# Patient Record
Sex: FEMALE | Race: BLACK OR AFRICAN AMERICAN
[De-identification: names, ages, dates, MRNs, and addresses within clinical notes are randomized per-mention and may not be internally consistent; named-entity substitution may affect disease eponyms.]

---

## 2024-02-04 ENCOUNTER — HEALTH MAINTENANCE LETTER (OUTPATIENT)
Age: 29
End: 2024-02-04

## 2025-04-19 ENCOUNTER — HEALTH MAINTENANCE LETTER (OUTPATIENT)
Age: 30
End: 2025-04-19

## 2025-06-05 ASSESSMENT — SLEEP AND FATIGUE QUESTIONNAIRES
HOW LIKELY ARE YOU TO NOD OFF OR FALL ASLEEP WHILE SITTING INACTIVE IN A PUBLIC PLACE: SLIGHT CHANCE OF DOZING
HOW LIKELY ARE YOU TO NOD OFF OR FALL ASLEEP WHEN YOU ARE A PASSENGER IN A CAR FOR AN HOUR WITHOUT A BREAK: SLIGHT CHANCE OF DOZING
HOW LIKELY ARE YOU TO NOD OFF OR FALL ASLEEP WHILE SITTING AND TALKING TO SOMEONE: SLIGHT CHANCE OF DOZING
HOW LIKELY ARE YOU TO NOD OFF OR FALL ASLEEP WHILE SITTING AND READING: MODERATE CHANCE OF DOZING
HOW LIKELY ARE YOU TO NOD OFF OR FALL ASLEEP WHILE SITTING QUIETLY AFTER LUNCH WITHOUT ALCOHOL: SLIGHT CHANCE OF DOZING
HOW LIKELY ARE YOU TO NOD OFF OR FALL ASLEEP WHILE LYING DOWN TO REST IN THE AFTERNOON WHEN CIRCUMSTANCES PERMIT: SLIGHT CHANCE OF DOZING
HOW LIKELY ARE YOU TO NOD OFF OR FALL ASLEEP IN A CAR, WHILE STOPPED FOR A FEW MINUTES IN TRAFFIC: WOULD NEVER DOZE
HOW LIKELY ARE YOU TO NOD OFF OR FALL ASLEEP WHILE WATCHING TV: SLIGHT CHANCE OF DOZING

## 2025-06-06 NOTE — PROGRESS NOTES
"Lorna is a 30 year old who is being evaluated via a billable video visit.      The patient has been notified of following:     \"This video visit will be conducted via a call between you and your physician/provider. We have found that certain health care needs can be provided without the need for an in-person physical exam.  This service lets us provide the care you need with a video conversation.  If a prescription is necessary we can send it directly to your pharmacy.  If lab work is needed we can place an order for that and you can then stop by our lab to have the test done at a later time.    Video visits are billed at different rates depending on your insurance coverage.  Please reach out to your insurance provider with any questions.    If during the course of the call the physician/provider feels a video visit is not appropriate, you will not be charged for this service.\"    Patient has given verbal consent for Video visit? Yes    How would you like to obtain your AVS? MyChart    If the video visit is dropped, the invitation should be resent by: Text to cell phone: [phone]    Will anyone else be joining your video visit? No    I    Video-Visit Details    Type of service:  Video Visit    Originating Location (pt. Location): Home    Distant Location (provider location):   [facility]    Platform used for Video Visit: Semantra    Video Start Time: 10:23 AM    Video End Time:11:05 AM     New Medical Weight Management Consult    PATIENT:  Lorna Pastor   MRN:         [id number]   :         1995  KAREL:         2025      Dear Juliette Grey MD,    I had the pleasure of seeing your patient, Lorna Pastor. Full intake/assessment was done to determine barriers to weight loss success and develop a treatment plan. Lorna Pastor is a 30 year old female interested in treatment of medical problems associated with excess weight. She has a height of 5' 4.02\", a " "weight of 212 lbs 0 oz, and the calculated Body mass index is 36.37 kg/m .    Assessment & Plan   Problem List Items Addressed This Visit       Class 2 obesity due to excess calories without serious comorbidity with body mass index (BMI) of 36.0 to 36.9 in adult - Primary    6/12/2025 initial evaluation. Pt will start Wegovy titration. Discussed mechanism of action, common side effects, titration guidelines, and discussed risks for pancreatitis/gallbladder problems/gastroparesis/low sugars/MTC/MEN2. Medication handout given in AVS. Discussed long term use and variable rate of weight loss.   Pt will read about Metformin as a 2nd option in case insurance denies coverage. We also discussed Contrave as an option.  We reviewed recommendations for muscle conservation including eating three meals daily, good protein intake, and strength training.   Dietician appointment later this month.  Baseline labs ordered.         Relevant Medications    semaglutide-weight management (WEGOVY) 0.25 MG/0.5ML pen    Semaglutide-Weight Management (WEGOVY) 0.5 MG/0.5ML pen    Semaglutide-Weight Management (WEGOVY) 1 MG/0.5ML pen    Other Relevant Orders    Vitamin D Screen    Hemoglobin A1c    TSH with free T4 reflex    Adult Nutrition  Referral        PROGRAM OVERVIEW  Reviewed options at [facility].   All questions were answered. Education provided on chronic disease management of obesity.    SURGICAL WEIGHT LOSS   Option presented given pt BMI and current comorbid conditions. No current interest due to multiple abdominal scars from previous surgery.    MEDICATIONS:  We discussed healthy habits to assist with weight loss. We reviewed medications associated with weight gain. We discussed the role of pharmacological agents in the treatment of obesity and the \"off-label\" use of medications in this practice. We reviewed medication that may assist with weight loss. Indications, contraindications, risks/benefits, and " potential side effects were discussed. Explained medications must always be used together with lifestyle changes. Reviewed rationale for long term use of pharmacotherapy in chronic disease management for obesity.      AOM Considerations:  Phentermine:  Avoid due to pt stating she cannot tolerate stimulants.  Topiramate: Pt wants to avoid d/t concerns for side effects.  GLP-1: On wegovy in past through online pharmacy, stopped due to cost. Rx sent for Wegovy today.     Naltrexone: Option   Wellbutrin: Option   Metformin: 2nd option  Contrave:  Qsymia:              PATIENT INSTRUCTIONS:    Our  will contact you to set up a consult with one of our surgeons to discuss how they can help with removing abdominal scar tissue.  Start Wegovy (semaglutide)   0.25 mg once weekly for 4-8 weeks,   if tolerating increase to 0.5 mg weekly for 4-8 weeks,   if tolerating increase to 1 mg weekly  May use famotidine 20 mg twice daily as needed for nausea/heartburn when starting the medication.   Read about Metformin or Contrave (naltrexone + wellbutrin) as other options in case insurance denies coverage of Wegovy.   3. Labs ordered.  Please call [phone] to set up a lab appt.      Goals:  Eat within 1 hour of waking. Try to eat every 4-6 hours. Prioritize protein 1st and fruit/veggies/fiber 2nd.  Strive to drink 64oz water throughout the day.  Great job with your activity! Aim for 150-300 minutes of activity per week and strength train twice a week to preserve muscle.      Follow up:    Call [phone] to schedule next visit in September with Mindy Vences NP   Dietician appointment 6/23/25    51 minutes spent on the date of the encounter doing chart review, history and exam, review test results, counseling, developing plan of care, documentation, and further activities as noted above.      She has the following co-morbidities:        6/5/2025     7:44 PM   --   I have the following health issues associated with  "obesity None of the above   I have the following symptoms associated with obesity Back Pain    Fatigue    Irregular Menstral Cycle   Not evaluated for PCOS, but pt reports she experiences symptoms of PCOS.         No data to display                    6/5/2025     7:44 PM   Referring Provider   Please name the provider who referred you to Medical Weight Management  If you do not know, please answer \"I Don't Know\" I dont know           6/5/2025     7:44 PM   Weight History   How concerned are you about your weight? Very Concerned   I became overweight After Pregnancy   The following factors have contributed to my weight gain Mental Health Issues    Started on Medication that Caused Weight Gain    Eating Too Much    Genetic (Runs in the Family)    Stress    Other   I have tried the following methods to lose weight Watching Portions or Calories    Exercise    Medications    Fasting   My lowest weight since age 19 was 198   My highest weight since age 19 was 236   The most weight I have ever lost was (lbs) 0   I have the following family history of obesity/being overweight My mother is overweight    Many of my relatives are overweight   How has your weight changed over the last year? Lost   How many pounds? 22   Age 10 had encopresis and needed GI flushes for chronic constipation. G-tube was placed in umbilicus. Transitioned to ileostomy and developed sepsis. Needed emergency open abdominal surgery with drain placement. No longer has ostomy. Many abdominal scars.    Lost 26lbs with diet/exercise and Wegovy through an online pharmacy. Was on it for 3 months. Stopped Wegovy 3 months ago due to cost. Was not going through insurance. Denies any side effects, especially bowel changes, with Wegovy. Has been focusing on diet and exercise over the past 6 months and has been following a low-calorie diet.        6/5/2025     7:44 PM   Diet Recall Review with Patient   If you do eat lunch, what types of food do you typically eat? I " have a sandwhich , or make air fryed nuggets   If you do eat supper, what types of food do you typically eat? Chicken , pasta   If you do snack, what types of food do you typically eat? Chips , nuts , fruits   How many glasses of juice do you drink in a typical day? 1   How many of glasses of milk do you drink in a typical day? 0   How many 8oz glasses of sugar containing drinks such as Jas-Aid/sweet tea do you drink in a day? 2   How many cans/bottles of sugar pop/soda/tea/sports drinks do you drink in a day? 3   How often do you have a drink of alcohol? 2-4 Times a Month   If you do drink, how many drinks might you have in a day? 1 or 2   Followed PCOS diet in past, but reports it was hard to maintain. Is eating in a calorie deficit and aims for 1300 calories/day.  Wakes: 6:45A  B: skips  L: 12P Usually a sandwich or soup or pasta  D: 5-6P chicken or turkey with pasta or salad, or sushi  Snacks: Biggest concern, will snack instead of eat a meal. Quest protein chips, Quest peanut butter cups, rice cakes. Craves salty foods.  Bedtime: 9:30P    Hydration: iced coffee w/ sugar free syrup in AM. Water throughout the day w/ crystal lite or liquid IV. Rare Celsius energy drink.        6/5/2025     7:44 PM   Eating Habits   Generally, my meals include foods like these bread, pasta, rice, potatoes, corn, crackers, sweet dessert, pop, or juice Almost Everyday   Generally, my meals include foods like these fried meats, brats, burgers, french fries, pizza, cheese, chips, or ice cream A Few Times a Week   Eat fast food (like McDonalds, Burger Omari, Taco Bell) A Few Times a Week   Eat at a buffet or sit-down restaurant Less Than Weekly   Eat most of my meals in front of the TV or computer Less Than Weekly   Often skip meals, eat at random times, have no regular eating times Everyday   Rarely sit down for a meal but snack or graze throughout Everyday   Eat extra snacks between meals Almost Everyday   Eat most of my food at the  end of the day A Few Times a Week   Eat in the middle of the night or wake up at night to eat Never   Eat extra snacks to prevent or correct low blood sugar Almost Everyday   Eat to prevent acid reflux or stomach pain Never   Worry about not having enough food to eat Never   I eat when I am depressed Almost Everyday   I eat when I am stressed Almost Everyday   I eat when I am bored Almost Everyday   I eat when I am anxious Almost Everyday   I eat when I am happy or as a reward Almost Everyday   I feel hungry all the time even if I just have eaten Almost Everyday   Feeling full is important to me Almost Everyday   I finish all the food on my plate even if I am already full Almost Everyday   I can't resist eating delicious food or walk past the good food/smell Less Than Weekly   I eat/snack without noticing that I am eating Less Than Weekly   I eat when I am preparing the meal A Few Times a Week   I eat more than usual when I see others eating Less Than Weekly   I have trouble not eating sweets, ice cream, cookies, or chips if they are around the house Never   I think about food all day Never   What foods, if any, do you crave? Chips/Crackers           6/5/2025     7:44 PM   Amount of Food   I feel out of control when eating Weekly   I eat a large amount of food, like a loaf of bread, a box of cookies, a pint/quart of ice cream, all at once Never   I eat a large amount of food even when I am not hungry Never   I eat rapidly Monthly   I eat alone because I feel embarrassed and do not want others to see how much I have eaten Never   I eat until I am uncomfortably full Weekly   I feel bad, disgusted, or guilty after I overeat Everyday   Denies intentionally vomiting after eating/using diuretics/laxatives or other purging type activities   Will emotionally binge eat once or twice/month. Stressed multiple times it happens less than once/week.    Binge Eating Screening:  Objective binges at least 1x per week for the past 3  months. No, 1-2 times/month.  Patient senses lack of control over eating during the binges.Yes  Binge eating causes stress. Yes, feels shameful.  Binge eating episodes are associated with 3 or more of the following:    Eating until uncomfortably full.  Eating large amounts when not physically hungry.   Eating much more rapidly than usual.   Eating alone due to being embarassed by the amount eaten.   Feeling disgusted, depressed, or guilty after overeating.   If patient answers yes to 3 of the above questions and answered yes to frequency, distress of eating behavior and avoids using compensatory behaviors, refer for binge eating assessment.            6/5/2025     7:44 PM   Activity/Exercise History   How much of a typical 12 hour day do you spend sitting? Less Than Half the Day   How much of a typical 12 hour day do you spend lying down? Less Than Half the Day   How much of a typical day do you spend walking/standing? Most of the Day   How many hours (not including work) do you spend on the TV/Video Games/Computer/Tablet/Phone? 1 Hour or Less   How many times a week are you active for the purpose of exercise? 2-3 Times a Week   What keeps you from being more active? Lack of Time   How many total minutes do you spend doing some activity for the purpose of exercising when you exercise? More Than 30 Minutes   Activity: Rides bikes, swims, does hiking on trails. Constantly outside and moving because she has 2 kids who have ASD and need to be moving often. Strength training 3x week.    PAST MEDICAL HISTORY:  Past Medical History:   Diagnosis Date    Asperger's syndrome     Bipolar disorders     Central auditory processing disorder     Constipation, chronic     Constipation, chronic Last 5 years    Severe constipation issues           6/5/2025     7:44 PM   Work/Social History Reviewed With Patient   My employment status is Full-Time    Stay at Home Parent    Student   My job is teacher   How much of your job is spent on  the computer or phone? Less Than 50%   How many hours do you spend commuting to work daily? 0   What is your marital status? /In a Relationship   If in a relationship, is your significant other overweight? N/A   If you have children, are they overweight? No   Who do you live with? My kids an    Who does the food shopping? 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  Pt does the cooking.  Eats out or gets takeout twice/month      Social History     Tobacco Use    Smoking status: Never   Vaping Use    Vaping status: Never Used   Substance Use Topics    Alcohol use: No    Drug use: No    1 alcoholic drink at social events        6/5/2025     7:44 PM   Mental Health History Reviewed With Patient   Have you ever been physically or sexually abused? No   How often in the past 2 weeks have you felt little interest or pleasure in doing things? Not at all   Over the past 2 weeks how often have you felt down, depressed, or hopeless? Not at all           6/5/2025     7:44 PM   Questions Reviewed With Patient   How ready are you to make changes regarding your weight? Number 1 = Not ready at all to make changes up to 10 = very ready. 10   How confident are you that you can change? 1 = Not confident that you will be successful making changes up to 10 = very confident. 10           6/5/2025     7:44 PM   Sleep History Reviewed With Patient   How many hours do you sleep at night? 11     STOP-BANG Total Score (range: 0 - 8) 2 (Low risk of KENDALL) Abnormal    Declines sleep referral.    MEDICATIONS:   Current Outpatient Medications   Medication Sig Dispense Refill    melatonin 3 MG tablet Take 9 mg by mouth nightly as needed.      semaglutide-weight management (WEGOVY) 0.25 MG/0.5ML pen Inject 0.5 mLs (0.25 mg) subcutaneously once a week. 2 mL 1    Semaglutide-Weight Management (WEGOVY) 0.5 MG/0.5ML pen Inject 0.5 mg subcutaneously once a week. 2 mL  1    Semaglutide-Weight Management (WEGOVY) 1 MG/0.5ML pen Inject 1 mg subcutaneously once a week. 2 mL 1       ALLERGIES:   Allergies   Allergen Reactions    Amoxicillin-Pot Clavulanate Hives       ROS:    HEENT  H/O glaucoma:  no  Cardiovascular  CAD:   no  Palpitations:   no  HTN:    No, only during pregnancy  Gastrointestinal  GERD:   no  Constipation:   yes  Liver Dz:   no  H/O Pancreatitis:  no  H/O Gastroparesis no  H/O Gallbladder Dz: no  Psychiatric  Anxiety:   yes  Depression:  yes  Bipolar:  yes  H/O ETOH/Drug abuse: no  H/O eating disorder: no  Endocrine  PMH/FMH of MTC or MEN2:  no  Neurologic:  H/O seizures:   no  Headaches:  yes  Memory Impairment:  no    H/O kidney stones:  no  Kidney disease:  No  Current birth control:  Nexplanon    LABS/RECORDS REVIEWED 6/12/2025. Reviewed outside CBC, BMP from 10/17/24.  TSH   Date Value Ref Range Status   01/03/2011 1.39 0.4 - 5.0 mU/L Final     Sodium   Date Value Ref Range Status   06/01/2011 139 133 - 143 mmol/L Final     Potassium   Date Value Ref Range Status   06/01/2011 4.2 3.4 - 5.3 mmol/L Final     Chloride   Date Value Ref Range Status   06/01/2011 106 96 - 110 mmol/L Final     Carbon Dioxide   Date Value Ref Range Status   06/01/2011 25 20 - 32 mmol/L Final     Anion Gap   Date Value Ref Range Status   06/01/2011 8 6 - 17 mmol/L Final     Glucose   Date Value Ref Range Status   06/01/2011 83 60 - 99 mg/dL Final     Urea Nitrogen   Date Value Ref Range Status   06/01/2011 11 5 - 24 mg/dL Final     Creatinine   Date Value Ref Range Status   06/01/2011 0.55 0.50 - 1.00 mg/dL Final     GFR Estimate   Date Value Ref Range Status   06/01/2011 GFR not calculated, patient <16 years old. mL/min/1.7m2 Final     Calcium   Date Value Ref Range Status   06/01/2011 7.6 (L) 8.7 - 10.8 mg/dL Final     ALT   Date Value Ref Range Status   05/30/2011 32 0 - 50 U/L Final     AST   Date Value Ref Range Status   05/30/2011 28 0 - 35 U/L Final     Cholesterol   Date Value  "Ref Range Status   01/03/2011 135 0 - 200 mg/dL Final     Comment:     LDL Cholesterol is the primary guide to therapy.     The NCEP recommends further evaluation of: patients with cholesterol <200   mg/dL   if additional risk factors are present, cholesterol >240 mg/dL, triglycerides   >150 mg/dL, or HDL <40 mg/dL.     HDL Cholesterol   Date Value Ref Range Status   01/03/2011 46 (L) 50 - 110 mg/dL Final     LDL Cholesterol Calculated   Date Value Ref Range Status   01/03/2011 77 0 - 129 mg/dL Final     Comment:     LDL Cholesterol is the primary guide to therapy: LDL-cholesterol goal in high   risk patients is <100 mg/dL and in very high risk patients is <70 mg/dL.     Triglycerides   Date Value Ref Range Status   05/30/2011 106 0 - 150 mg/dL Final     Hemoglobin   Date Value Ref Range Status   05/27/2011 8.2 (L) 11.7 - 15.7 g/dL Final           BP Readings from Last 6 Encounters:   08/21/12 113/64   05/09/12 113/70   06/09/11 107/65 (46%, Z = -0.10 /  50%, Z = 0.00)*   06/02/11 119/78 (85%, Z = 1.04 /  92%, Z = 1.41)*   04/13/11 103/69 (51%, Z = 0.03 /  74%, Z = 0.64)*     *BP percentiles are based on the 2017 AAP Clinical Practice Guideline for girls       Pulse Readings from Last 6 Encounters:   08/21/12 109   05/09/12 79   06/09/11 87   06/02/11 102   04/12/11 76       PHYSICAL EXAM:  Ht 5' 4.02\" (1.626 m)   Wt 212 lb (96.2 kg)   BMI 36.37 kg/m    GENERAL: Healthy, alert and no distress  RESP: No audible wheeze, cough, or visible cyanosis.  No visible retractions or increased work of breathing.    SKIN: Visible skin clear. No significant rash, abnormal pigmentation or lesions.  PSYCH: Mentation appears normal, affect normal/bright, judgement and insight intact, normal speech and appearance well-groomed.    COUNSELING:   Reviewed obesity as a chronic disease and comprehensive management stratagies.      We discussed Bariatric Basics including:  -eating 3 meals daily  -eating protein first  -eating slowly, " chewing food well  -avoiding/limiting calorie containing beverages  -limiting carbohydrates and changing to whole grains  -limiting restaurant or cafeteria eating to twice a week or less    We discussed the importance of restorative sleep and stress management in maintaining a healthy weight.  We discussed insulin resistance and glycemic index as it relates to appetite and weight control.   We discussed the importance of physical activity including cardiovascular and strength training in maintaining a healthier weight and explored viable options.  Patient education of above written in AVS.      Sincerely,    Mindy Vences NP

## 2025-06-12 ENCOUNTER — VIRTUAL VISIT (OUTPATIENT)
Dept: SURGERY | Facility: CLINIC | Age: 30
End: 2025-06-12
Payer: COMMERCIAL

## 2025-06-12 VITALS — HEIGHT: 64 IN | BODY MASS INDEX: 36.19 KG/M2 | WEIGHT: 212 LBS

## 2025-06-12 DIAGNOSIS — E66.812 CLASS 2 OBESITY DUE TO EXCESS CALORIES WITHOUT SERIOUS COMORBIDITY WITH BODY MASS INDEX (BMI) OF 36.0 TO 36.9 IN ADULT: Primary | ICD-10-CM

## 2025-06-12 DIAGNOSIS — E66.09 CLASS 2 OBESITY DUE TO EXCESS CALORIES WITHOUT SERIOUS COMORBIDITY WITH BODY MASS INDEX (BMI) OF 36.0 TO 36.9 IN ADULT: Primary | ICD-10-CM

## 2025-06-12 PROBLEM — N30.00 ACUTE CYSTITIS WITHOUT HEMATURIA: Status: ACTIVE | Noted: 2021-07-18

## 2025-06-12 PROBLEM — R87.610 ASCUS OF CERVIX WITH NEGATIVE HIGH RISK HPV: Status: ACTIVE | Noted: 2024-09-05

## 2025-06-12 NOTE — ASSESSMENT & PLAN NOTE
6/12/2025 initial evaluation. Pt will start Wegovy titration. Discussed mechanism of action, common side effects, titration guidelines, and discussed risks for pancreatitis/gallbladder problems/gastroparesis/low sugars/MTC/MEN2. Medication handout given in AVS. Discussed long term use and variable rate of weight loss.   Pt will read about Metformin as a 2nd option in case insurance denies coverage. We also discussed Contrave as an option.  We reviewed recommendations for muscle conservation including eating three meals daily, good protein intake, and strength training.   Dietician appointment later this month.  Baseline labs ordered.

## 2025-06-12 NOTE — Clinical Note
Christian Rodriguez. Could you please reach out to her to set up a consult with one of the surgeons? She is not interested in bariatric surgery but has many abdominal scars from previous surgeries and wants to talk to a surgeon about possible surgery to remove scar tissue. -Mindy

## 2025-06-12 NOTE — PATIENT INSTRUCTIONS
"It was nice to talk with you today! Below is the plan discussed.-  BREANNE Guillen      Pt Instructions:  Our  will contact you to set up a consult with one of our surgeons to discuss how they can help with removing abdominal scar tissue.  Start Wegovy (semaglutide)   0.25 mg once weekly for 4-8 weeks,   if tolerating increase to 0.5 mg weekly for 4-8 weeks,   if tolerating increase to 1 mg weekly  May use famotidine 20 mg twice daily as needed for nausea/heartburn when starting the medication.   Read about Metformin or Contrave (naltrexone + wellbutrin) as other options in case insurance denies coverage of Wegovy.   3. Labs ordered.  Please call [phone] to set up a lab appt.      Goals:  Eat within 1 hour of waking. Try to eat every 4-6 hours. Prioritize protein 1st and fruit/veggies/fiber 2nd.  Strive to drink 64oz water throughout the day.  Great job with your activity! Aim for 150-300 minutes of activity per week and strength train twice a week to preserve muscle.      Follow up:    Call [phone] to schedule next visit in September with Mindy Vences, NP   Dietician appointment 6/23/25                            Obesity is a complex chronic disease    Obesity is a brain disease that causes dysregulation of our energy system.  It is not a character flaw it is a chemistry flaw.      It has many causes.  80% is genetic.  These can be influenced by factors like an altered microbiome (microbes in your gut), endocrine disrupting chemicals, sedentary lifestyle, low nutrient/ high calorie foods, and weight promoting medications.     Energy homeostasis is tightly regulated by the central nervous system. The hypothalamus is the primary center for the regulation of energy balance.  The thermostat is influenced by signals in response to fullness, hunger, and others. With obesity are several impairments in those signals to your hypothalamus.  It causes your thermostat \"set point\"  is be too high.      When " you lose weight, you body's response it to defend its set point. Signals will be sent to your hypothalamus to decrease your metabolism, increase hunger, and decrease feeling of fullness.  This ultimately drives your weight back to you set range.     Medications can help regulate those signals.  If you respond well to obesity medications, these should be used lifelong.  Otherwise, if removed, your body will go back to that dysregulated state and defend its set point.  (You will regain your weight.)    Obesity is a chronic disease. It is hard to treat but we are learning more every day.  Treatment is improving by leaps and bounds.  The best thing you can do is continue close follow up with your obesity medicine provider.  Together we can tackle this disease.          LEAN PROTEIN SOURCES      Protein Source Portion Calories Grams of Protein                           Nonfat, plain Greek yogurt    (10 grams sugar or less) 3/4 cup (6 oz)  12-17   Light Yogurt (10 grams sugar or less) 3/4 cup (6 oz)  6-8   Protein Shake 1 shake 110-180 15-30   Skim/1% Milk or lactose-free milk 1 cup ( 8 oz)  8   Plain or light, flavored soymilk 1 cup  7-8   Plain or light, hemp milk 1 cup 110 6   Fat Free or 1% Cottage Cheese 1/2 cup 90 15   Part skim ricotta cheese 1/2 cup 100 14   Part skim or reduced fat cheese slices 1 ounce 65-80 8     Mozzarella String Cheese 1 80 8   Canned tuna, chicken, crab or salmon  (canned in water)  1/2 cup 100 15-20   White fish (broiled, grilled, baked) 3 ounces 100 21   [place]/Tuna (broiled, grilled, baked) 3 ounces 150-180 21   Shrimp, Scallops, Lobster, Crab 3 ounces 100 21   Pork loin, Pork Tenderloin 3 ounces 150 21   Boneless, skinless chicken /turkey breast                          (broiled, grilled, baked) 3 ounces 120 21   [place], [place] Mix, [place], and Venison 3 ounces 120 21   Lean cuts of red meat and pork (sirloin,   round, tenderloin, flank, ground 93%-96%) 3 ounces 170 21    Lean or Extra Lean Ground Turkey 1/2 cup 150 20   90-95% Lean [place] Burger 1 fallon 140-180 21   Low-fat casserole with lean meat 3/4 cup 200 17   Luncheon Meats                                                        (turkey, lean ham, roast beef, chicken) 3 ounces 100 21   Egg (boiled, poached, scrambled) 1 Egg 60 7   Egg Substitute 1/2 cup 70 10   Nuts (limit to 1 serving per day)  3 Tbsp. 150 7   Nut [place] (peanut, almond)  Limit to 1 serving or less daily 1 Tbsp. 90 4   Soy Burger (varies) 1  15   Garbanzo, Black, Kim Beans 1/2 cup 110 7   Refried Beans 1/2 cup 100 7   Kidney and Lima beans 1/2 cup 110 7   Tempeh 3 oz 175 18   Vegan crumbles 1/2 cup 100 14   Tofu 1/2 cup 110 14   Chili (beans and extra lean beef or turkey) 1 cup 200 23   Lentil Stew/Soup 1 cup 150 12   Black Bean Soup 1 cup 175 12      WEGOVY (semaglutide)      Wegovy (semaglutide) injection 2.4 mg is an injectable prescription medicine used for adults with obesity (BMI >=30) or overweight (excess weight) (BMI >=27) who also have weight-related medical problems to help them lose weight and keep the weight off.  It is a GLP-1 agonist medication. GLP1 agonists stimulate the hormone GLP-1 in your body to allow you to feel full quickly and stay full longer.    Directions:  Start Wegovy (semaglutide) 0.25 mg once weekly for 4 weeks, then if tolerating increase to 0.5 mg weekly for 4 weeks, then if tolerating increase to 1 mg weekly for 4 weeks, then if tolerating increase to 1.7 mg weekly for 4 weeks, then if tolerating increase to 2.4 mg weekly thereafter.      -Each Wegovy pen is a once weekly single-dose prefilled pen with a pen injector already built within the pen. Discard the Wegovy pen after use in sharps container.     Common Side Effects:    nausea, headache, diarrhea, stomach upset.  If these become unmanageable call or mychart.    Serious Side effects:   Pancreatitis (inflammation of the pancreas) has been associated with this  type of medication, but is very rare.Symptoms of pancreatitis include: Pain in your upper stomach area which may travel to your back and be worse after eating.     Tips to help with side effects:  For Nausea/Heartburn:  Eat small, protein focused meals throughout the day  Stay hydrated.-The medication can decrease your drive for thirst  Eat slowly. STOP at the first sign of satisfaction  Eat at regular intervals, letting hours pass without eating- can cause nausea.  AVOID foods that are high in fat and sugar .      For constipation:  Stay hydrated and make sure you are moving.    Miralax 1 scoop/packet up daily    Ducosate Sodium 1 pill twice daily (stool softener)    Storage:   Store the prescription in the refrigerator. Once it is time to use the Wegovy pen, you can keep the pen at room temperature and it is good for up to 28 days at room temperature.     How to inject:  For a video on how to use the pen please go to:  https://[email or web address]/about-[employer]/how-to-use-the-wegovy-pen.html#itemTwo      METFORMIN    Metformin is a medication that is used to assist with lowering blood sugars in patients that have Pre-Diabetes or Diabetes. It has also been found to help with modest weight loss.    Metformin helps to lower blood sugars by lowering the amount of sugar (glucose) your liver produces that would otherwise cause your blood sugar to increase. It also makes your body respond better to insulin (the product that lowers your blood sugar).     Emerging research reported in journals suggests metformin may also lead to weight loss as a result of changes in the appetite centers of the brain, shifts in the gut microbiome, and reversal of metabolic changes that usually happen with age.    Starting instructions:  Take 1 tablet by mouth daily with a meal for 1 week, then increase to 2 tablets daily with a meal, if tolerating can increase to 3 tablets daily with a meal or at bedtime.     Side-effects. Metformin is generally  "well tolerated. The main side-effects we see are: Diarrhea, nausea and stomach upset - this tends to subside over time as your body gets used to the medication. Taking this medications with food will lower these side effects.    For any questions or concerns please send a [employer] message to our team or call our weight management call center at [phone].     In order to get refills of this or any medication we prescribe you must be seen in the medical weight mgmt clinic every 6 months.        Naltrexone    Naltrexone is a medication that is used most often to help people who are troubled by dependence on prescription pain killers or alcohol. It has also been found to help with weight loss. Although it's not currently FDA approved for weight loss, it has been used safely for a number of years to help people who are carrying extra weight.     Just how Naltrexone helps with weight loss has not been exactly determined.  It seems to work by quieting down brain signals related to strong food cravings. Many of our patients use the word \"addiction\" to describe their feelings and constant thoughts about food. It makes sense then to treat the feeling of dependence on food, outside of real hunger, with a medication designed to help with other sorts of dependence.     Our patients on Naltrexone find that they:    >feel less interest in food   >think less about food and eating and have more time to think of other things   >find it easier to push the plate away   >have an easier time eating less    For some of our patients, these feelings are very immediate. Other patients, don't feel much of a change but find they've lost weight. Like all weight loss medications, Naltrexone works best when you help it work. This means:  1. Having less tempting high calorie (fattening) food around the house or office. (For people with strong cravings this is very important.)   2. Staying away from situations or people that may trigger your " cravings .   3. Eating out only one time or less each week.  4. Eating your meals at a table with the TV or computer off.    Side-effects. Naltrexone is generally well tolerated. The main side-effect we see is  nausea or a woozy feeling. A small number of people feel quite ill. Most people have a mild reaction and some people have no reaction at all.  The good news is that this feeling does go away.     In order to avoid nausea, please start the medication with half a pill for the first few days. Go on to a full pill if you are feeling well.      If you  are nauseated on 1/2 a pill it is okay to cut back to 1/4 pill ( a very small amount). Take this for a couple of days and work your way back up to a 1/2 pill and then a whole pill. Taking the medication at night or with food  to start also may help prevent the feeling of nausea.       WARNING: This medication blocks the action of opioid type pain medications.    If you routinely take narcotic pain medication like Codeine, Oxycontin,Percocet,Morphine,Dilaudid or Methodone, do not take this until you have talked with weight management staff.   2.  If you are planning surgery you should stop Naltrexone 4 days prior to the surgery.   3.  If you have an injury that requires pain medication, make sure the health care staff knows you take Naltrexone.       For any questions/concerns contact [facility] [phone]     Bupropion (Wellbutrin)    We are starting Bupropion (Wellbutrin). Bupropion helps lessen appetite and may mildly increase and energy.      Instructions:  Take 1 tablet in the morning for the first week, if tolerating then increase to one tablet in the morning and one tablet in the evening.  (If you have trouble with sleep you can take your second dose in the afternoon instead)    For some of our patients the medication works right away. Other patients don't feel much of a change but find they've lost weight. Like all weight loss  medications, bupropion works best when you help it work. This means:  1. Having less tempting high calorie (fattening) food around the house or office. (For people with strong cravings this is very important.)   2. Staying away from situations or people that may trigger your cravings .   3. Eating out only one time or less each week.  4. Eating your meals at a table with the TV or computer off.    Side-effects: The most common side effect include: nausea; constipation; headache;  trouble sleeping; and dry mouth.     Call the nurse at [phone] if you have any questions or concerns. (Do not stop taking it if you don't think it's working. For some people it works without them knowing it.)       In order to get refills of this or any medication we prescribe you must be seen in the medical weight mgmt clinic every 2-4 months.       CONTRAVE (bupropion and naltrexone)    We are considering starting Contrave. Contrave is specifically prescribed for obesity. It is a combination of two medications, Naltrexone and Bupropione (Wellbutrin). The Bupropion helps lessen appetite and the Naltrexone works by blocking certain receptors in the brain and curbing cravings.      For some of our patients the medication works right away. Other patients don't feel much of a change but find they've lost weight. Like all weight loss medications, Contrave works best when you help it work. This means:  1. Having less tempting high calorie (fattening) food around the house or office. (For people with strong cravings this is very important.)   2. Staying away from situations or people that may trigger your cravings .   3. Eating out only one time or less each week.  4. Eating your meals at a table with the TV or computer off.    WARNING: This medication blocks the action of opioid type pain medications. If you routinely take any medication like Codeine, Oxycontin, Percocet, Morphine, Dilaudid or Methodone, do not take this until you have  talked with weight management staff.     FYI- If you are planning on having an elective surgery, you should start titrating yourself off Contrave 4 weeks prior to surgery. This would entail decreasing the same way you started the medication, by taking one tablet less per week for 4 weeks, until you are able to stop the medication. If you need to stop it quicker, you can take 1 tablet in the morning and 1 tablet in the evening for 2 weeks, and then stop the medication. Please don't hesitate to call if you have any questions regarding this.    Dosing as follows:  Week 1- 1 tablet in the morning  Week 2- 1 tablet in the morning and 1 tablet at bedtime  Week 3- 2 tablets in the morning and 1 tablet at bedtime  Week 4 and thereafter- 2 tablets in the morning and 2 tablets at bedtime    Side-effects: The most common side effect include: nausea; constipation; headache; vomiting; dizziness; diarrhea; trouble sleeping; and dry mouth.     This medication typically isn t covered by insurance and will require a prior authorization, which can take 1-2 weeks to review. Patients can visit [email or web address] and sign up for the Pharmacy savings program and receive the medication for $60-70 per month for 12 months if eligible.    For any questions or concerns please send a [employer] message to our team or call our weight management call center at [phone] during regular business hours. For questions during evenings or weekends your messages will be addressed during the next business day.  For emergencies please call 911 or seek immediate medical care.     (Do not stop taking it if you don't think it's working. For some people it works without them knowing it.)

## 2025-06-17 ENCOUNTER — TELEPHONE (OUTPATIENT)
Dept: SURGERY | Facility: CLINIC | Age: 30
End: 2025-06-17
Payer: COMMERCIAL

## 2025-06-17 NOTE — TELEPHONE ENCOUNTER
Prior Authorization Retail Medication Request    Medication/Dose: semaglutide-weight management (WEGOVY) 0.25 MG/0.5ML pen   Semaglutide-Weight Management (WEGOVY) 0.5 MG/0.5ML pen   Semaglutide-Weight Management (WEGOVY) 1 MG/0.5ML pen     Diagnosis and ICD code (if different than what is on RX):  Class 2 obesity due to excess calories without serious comorbidity with body mass index (BMI) of 36.0 to 36.9 in adult [E66.812, E66.09, Z68.36]  - Primary    New/renewal/insurance change PA/secondary ins. PA: NEW    Previously Tried and Failed:  diet, exercise, and lifestyle    Rationale:  AOM Considerations:  Phentermine:   Avoid due to pt stating she cannot tolerate stimulants.  Topiramate: Pt wants to avoid d/t concerns for side effects.  GLP-1: On wegovy in past through online pharmacy, stopped due to cost. Rx sent for Wegovy today.                         Naltrexone: Option        Wellbutrin: Option         Metformin: 2nd option  Contrave:  Qsymia:                                Insurance   Primary: [facility]   Insurance ID:  [id number]     Secondary (if applicable):  Secondary: [place]ICA[place]  Insurance ID:     Pharmacy Information (if different than what is on RX)  Name:    [facility] #[id number] [place] [address] AT [facility] & [facility]  Phone:  [phone]   Fax:[phone]

## 2025-06-18 NOTE — TELEPHONE ENCOUNTER
PA Initiation    Medication: WEGOVY 0.25 MG/0.5ML SC SOAJ  Insurance Company: [employer] - Phone [phone] Fax [phone]  Pharmacy Filling the Rx: [facility] #[id number] - [address] AT [facility] & [facility] [address]  Filling Pharmacy Phone: [phone]  Filling Pharmacy Fax: [phone]  Start Date: 6/18/2025

## 2025-06-19 NOTE — TELEPHONE ENCOUNTER
Prior Authorization Approval    Medication: WEGOVY 0.25 MG/0.5ML SC SOAJ  Authorization Effective Date: 6/19/2025  Authorization Expiration Date: 12/19/2025  Reference #: [id number]   Insurance Company: [employer] - Phone [phone] Fax [phone]  Which Pharmacy is filling the prescription: [id number] [place] [address] [facility] [place] [address]  Pharmacy Notified: YES  Patient Notified: YES

## 2025-06-20 NOTE — PROGRESS NOTES
"Lorna is a 30 year old who is being evaluated via a billable video visit.      The patient has been notified of following:     \"This video visit will be conducted via a call between you and your physician/provider. We have found that certain health care needs can be provided without the need for an in-person physical exam.  This service lets us provide the care you need with a video conversation.  If a prescription is necessary we can send it directly to your pharmacy.  If lab work is needed we can place an order for that and you can then stop by our lab to have the test done at a later time.    Video visits are billed at different rates depending on your insurance coverage.  Please reach out to your insurance provider with any questions.    If during the course of the call the physician/provider feels a video visit is not appropriate, you will not be charged for this service.\"    Patient has given verbal consent for Video visit? Yes    How would you like to obtain your AVS? MyChart    If the video visit is dropped, the invitation should be resent by: Text to cell phone: [phone]    Will anyone else be joining your video visit? No    I    Video-Visit Details    Type of service:  Video Visit    Originating Location (pt. Location): stationary car    Distant Location (provider location):   Off-Site - Provider Home Office    Platform used for Video Visit: [employer]    Video Start Time: 3:00    Video End Time: 3:24     MEDICAL WEIGHT LOSS INITIAL EVALUATION  Patient accompanied by:self  DIAGNOSIS:  Class II Obesity    NUTRITION/EXERCISE HISTORY:    6/5/2025     7:44 PM    Diet Recall Review with Patient   If you do eat lunch, what types of food do you typically eat? I have a sand which , or make air fryed nuggets   If you do eat supper, what types of food do you typically eat? Chicken , pasta   If you do snack, what types of food do you typically eat? Chips , nuts , fruits   How many glasses of juice do you drink in a " typical day? 1   How many of glasses of milk do you drink in a typical day? 0   How many 8oz glasses of sugar containing drinks such as Jas-Aid/sweet tea do you drink in a day? 2   How many cans/bottles of sugar pop/soda/tea/sports drinks do you drink in a day? 3   How often do you have a drink of alcohol? 2-4 Times a Month   If you do drink, how many drinks might you have in a day? 1 or 2   Followed PCOS diet in past, but reports it was hard to maintain. Is eating in a calorie deficit and aims for 1300 calories/day.  Wakes: 6:45A  B: skips  L: 12P Usually a sandwich or soup or pasta  D: 5-6P chicken or turkey with pasta or salad, or sushi  Snacks: Biggest concern, will snack instead of eat a meal. Quest protein chips, Quest peanut butter cups, rice cakes. Craves salty foods.  Bedtime: 9:30P    Hydration: iced coffee w/ sugar free syrup in AM. Water throughout the day w/ crystal lite or liquid IV. Rare Celsius energy drink.          6/5/2025     7:44 PM   Eating Habits   Generally, my meals include foods like these bread, pasta, rice, potatoes, corn, crackers, sweet dessert, pop, or juice Almost Everyday   Generally, my meals include foods like these fried meats, brats, burgers, french fries, pizza, cheese, chips, or ice cream A Few Times a Week   Eat fast food (like [employer], [employer]) A Few Times a Week   Eat at a buffet or sit-down restaurant Less Than Weekly   Eat most of my meals in front of the TV or computer Less Than Weekly   Often skip meals, eat at random times, have no regular eating times Everyday   Rarely sit down for a meal but snack or graze throughout Everyday   Eat extra snacks between meals Almost Everyday   Eat most of my food at the end of the day A Few Times a Week   Eat in the middle of the night or wake up at night to eat Never   Eat extra snacks to prevent or correct low blood sugar Almost Everyday   Eat to prevent acid reflux or stomach pain Never   Worry about not having  enough food to eat Never   I eat when I am depressed Almost Everyday   I eat when I am stressed Almost Everyday   I eat when I am bored Almost Everyday   I eat when I am anxious Almost Everyday   I eat when I am happy or as a reward Almost Everyday   I feel hungry all the time even if I just have eaten Almost Everyday   Feeling full is important to me Almost Everyday   I finish all the food on my plate even if I am already full Almost Everyday   I can't resist eating delicious food or walk past the good food/smell Less Than Weekly   I eat/snack without noticing that I am eating Less Than Weekly   I eat when I am preparing the meal A Few Times a Week   I eat more than usual when I see others eating Less Than Weekly   I have trouble not eating sweets, ice cream, cookies, or chips if they are around the house Never   I think about food all day Never   What foods, if any, do you crave? Chips/Crackers              6/5/2025     7:44 PM   Amount of Food   I feel out of control when eating Weekly   I eat a large amount of food, like a loaf of bread, a box of cookies, a pint/quart of ice cream, all at once Never   I eat a large amount of food even when I am not hungry Never   I eat rapidly Monthly   I eat alone because I feel embarrassed and do not want others to see how much I have eaten Never   I eat until I am uncomfortably full Weekly   I feel bad, disgusted, or guilty after I overeat Everyday   Denies intentionally vomiting after eating/using diuretics/laxatives or other purging type activities   Will emotionally binge eat once or twice/month. Stressed multiple times it happens less than once/week.     Binge Eating Screening:  Objective binges at least 1x per week for the past 3 months. No, 1-2 times/month.  Patient senses lack of control over eating during the binges.Yes  Binge eating causes stress. Yes, feels shameful.  Binge eating episodes are associated with 3 or more of the following:    Eating until uncomfortably  "full.  Eating large amounts when not physically hungry.   Eating much more rapidly than usual.   Eating alone due to being embarrassed by the amount eaten.   Feeling disgusted, depressed, or guilty after overeating.   If patient answers yes to 3 of the above questions and answered yes to frequency, distress of eating behavior and avoids using compensatory behaviors, refer for binge eating assessment.               6/5/2025     7:44 PM   Activity/Exercise History   How much of a typical 12 hour day do you spend sitting? Less Than Half the Day   How much of a typical 12 hour day do you spend lying down? Less Than Half the Day   How much of a typical day do you spend walking/standing? Most of the Day   How many hours (not including work) do you spend on the TV/Video Games/Computer/Tablet/Phone? 1 Hour or Less   How many times a week are you active for the purpose of exercise? 2-3 Times a Week   What keeps you from being more active? Lack of Time   How many total minutes do you spend doing some activity for the purpose of exercising when you exercise? More Than 30 Minutes   Activity: Rides bikes, swims, does hiking on trails. Constantly outside and moving because she has 2 kids who have ASD and need to be moving often. Strength training 3x week.       ADDITIONAL INFORMATION: Works as a special education para. Has two kids that are 3 and 6 year old (both are on Autism spectrum). Want to get back to tracking intake on an tonio.     WEIGHT LOSS MEDICATIONS:  Wegovy      ANTHROPOMETRICS:  Height: 64.02\"  Weight: 212 lb  patient reported   BMI: 36.37 kg/m2  NUTRITION DIAGNOSIS:   Overweight/Obese class II related to food and nutrition knowledge deficit as evidence by patient's subjective statements and BMI of 36.37 kg/m2   NUTRITION INTERVENTIONS  Nutrition Prescription:  Recommend modified energy- nutrient intake  Implementation:  Nutrition Education (Content):  Discussed portion sizes/plate guidelines    Determined " alternative to emotional eating  Reviewed healthy snacking and beverage choices  Provided: Smart Tips to Power up with Breakfast, Plate Guidelines, Protein Sources for Weight Loss, Building a Healthy Relationship with Food    Nutrition Education (Application):   Patient to practice goals as stated below  Patient verbalizes understanding of diet by wanting kcal level to follow on tonio  Expected patient engagement: good    Goals:  Eat breakfast daily or have a protein drink  Criteria for selecting a protein drink/8-12 ounces:   100-190 calories  15-30 grams protein  < 10 grams total fat  < 10 grams total sugar   When tracking on tonio aim for 9499-4851 kcal (14-17 kcal/kg ABW)  Focus on alteratives to emotional eating (activities with kids, journaling)         FOLLOW UP AND MONITORING:    Other  - follow up in 4-8 weeks.     TIME SPENT WITH PATIENT:   23 minutes   Kam Ponce RD, KIANNA  [facility]

## 2025-06-20 NOTE — PATIENT INSTRUCTIONS
Christian Rothman-  Welcome to the [facility], [place]! It was great to visit with you and learn about your interest in weight loss. Below are the goals we discussed.  Goals:  Eat breakfast daily or have a protein drink  Criteria for selecting a protein drink/8-12 ounces:   100-190 calories  15-30 grams protein  < 10 grams total fat  < 10 grams total sugar   When tracking on tonio aim for 2786-1042 kcal   Focus on alteratives to emotional eating (activities with kids, journaling)     Nutrition Educational Materials:  Protein Sources for Weight Loss     Building a Healthy Relationship with Food    Tips for Weight Loss and Weight Management      Please call [phone] to schedule your next visit with a Dietitian in 4-8 weeks.    Please reach out to your care team through [facility] with any questions or concerns.    Thanks!  Kam Ponce RD, LD  [facility] [place]

## 2025-06-23 ENCOUNTER — TELEPHONE (OUTPATIENT)
Dept: SURGERY | Facility: CLINIC | Age: 30
End: 2025-06-23

## 2025-06-23 ENCOUNTER — VIRTUAL VISIT (OUTPATIENT)
Dept: SURGERY | Facility: CLINIC | Age: 30
End: 2025-06-23
Payer: COMMERCIAL

## 2025-06-23 DIAGNOSIS — E66.09 CLASS 2 OBESITY DUE TO EXCESS CALORIES WITHOUT SERIOUS COMORBIDITY WITH BODY MASS INDEX (BMI) OF 36.0 TO 36.9 IN ADULT: Primary | ICD-10-CM

## 2025-06-23 DIAGNOSIS — E66.812 CLASS 2 OBESITY DUE TO EXCESS CALORIES WITHOUT SERIOUS COMORBIDITY WITH BODY MASS INDEX (BMI) OF 36.0 TO 36.9 IN ADULT: Primary | ICD-10-CM

## 2025-06-23 PROCEDURE — 97802 MEDICAL NUTRITION INDIV IN: CPT | Mod: 95 | Performed by: DIETITIAN, REGISTERED

## 2025-06-23 NOTE — TELEPHONE ENCOUNTER
Pt doesn't need to be roomed by MA before visit.  RD alerted 2 minutes prior to appt of this that was a concern of pt.  RD calls pts and no pre call made.  Will close encounter as RD will call.  Jodi Crocker MS, RD, RN

## 2025-06-23 NOTE — TELEPHONE ENCOUNTER
General Call      Reason for Call:  appt at 3pm    What are your questions or concerns:  patient hasn't received call to get check in       [facility] we [facility] this [facility] to you [facility] or would you prefer to receive a phone call?:   Patient would prefer a phone call   Okay to leave a detailed message?: Yes at Cell number on file:    Telephone Information:   Mobile [phone]

## 2025-09-04 ENCOUNTER — VIRTUAL VISIT (OUTPATIENT)
Dept: SURGERY | Facility: CLINIC | Age: 30
End: 2025-09-04
Payer: COMMERCIAL

## 2025-09-04 VITALS — BODY MASS INDEX: 36.37 KG/M2 | HEIGHT: 64 IN

## 2025-09-04 DIAGNOSIS — E66.812 CLASS 2 OBESITY DUE TO EXCESS CALORIES WITHOUT SERIOUS COMORBIDITY WITH BODY MASS INDEX (BMI) OF 36.0 TO 36.9 IN ADULT: Primary | ICD-10-CM

## 2025-09-04 DIAGNOSIS — E66.09 CLASS 2 OBESITY DUE TO EXCESS CALORIES WITHOUT SERIOUS COMORBIDITY WITH BODY MASS INDEX (BMI) OF 36.0 TO 36.9 IN ADULT: Primary | ICD-10-CM
